# Patient Record
Sex: MALE | Race: WHITE | NOT HISPANIC OR LATINO | Employment: FULL TIME | URBAN - METROPOLITAN AREA
[De-identification: names, ages, dates, MRNs, and addresses within clinical notes are randomized per-mention and may not be internally consistent; named-entity substitution may affect disease eponyms.]

---

## 2017-01-03 ENCOUNTER — OFFICE VISIT (OUTPATIENT)
Dept: RETAIL CLINIC | Facility: CLINIC | Age: 27
End: 2017-01-03

## 2017-01-03 VITALS
TEMPERATURE: 99.7 F | OXYGEN SATURATION: 98 % | SYSTOLIC BLOOD PRESSURE: 116 MMHG | HEART RATE: 75 BPM | RESPIRATION RATE: 18 BRPM | DIASTOLIC BLOOD PRESSURE: 64 MMHG

## 2017-01-03 DIAGNOSIS — K52.9 GASTROENTERITIS: Primary | ICD-10-CM

## 2017-01-03 PROCEDURE — 99213 OFFICE O/P EST LOW 20 MIN: CPT | Performed by: NURSE PRACTITIONER

## 2017-01-03 RX ORDER — ONDANSETRON 4 MG/1
4 TABLET, ORALLY DISINTEGRATING ORAL EVERY 8 HOURS PRN
Qty: 21 TABLET | Refills: 0 | Status: SHIPPED | OUTPATIENT
Start: 2017-01-03 | End: 2017-01-10

## 2017-01-03 NOTE — PATIENT INSTRUCTIONS
Norovirus Infection  A norovirus infection is caused by exposure to a virus in a group of similar viruses (noroviruses). This type of infection causes inflammation in your stomach and intestines (gastroenteritis). Norovirus is the most common cause of gastroenteritis. It also causes food poisoning.  Anyone can get a norovirus infection. It spreads very easily (contagious). You can get it from contaminated food, water, surfaces, or other people. Norovirus is found in the stool or vomit of infected people. You can spread the infection as soon as you feel sick until 2 weeks after you recover.   Symptoms usually begin within 2 days after you become infected. Most norovirus symptoms affect the digestive system.  CAUSES  Norovirus infection is caused by contact with norovirus. You can catch norovirus if you:  · Eat or drink something contaminated with norovirus.  · Touch surfaces or objects contaminated with norovirus and then put your hand in your mouth.  · Have direct contact with an infected person who has symptoms.  · Share food, drink, or utensils with someone with who is sick with norovirus.  SIGNS AND SYMPTOMS  Symptoms of norovirus may include:  · Nausea.  · Vomiting.  · Diarrhea.  · Stomach cramps.  · Fever.  · Chills.  · Headache.  · Muscle aches.  · Tiredness.  DIAGNOSIS  Your health care provider may suspect norovirus based on your symptoms and physical exam. Your health care provider may also test a sample of your stool or vomit for the virus.   TREATMENT  There is no specific treatment for norovirus. Most people get better without treatment in about 2 days.  HOME CARE INSTRUCTIONS  · Replace lost fluids by drinking plenty of water or rehydration fluids containing important minerals called electrolytes. This prevents dehydration. Drink enough fluid to keep your urine clear or pale yellow.  · Do not prepare food for others while you are infected. Wait at least 3 days after recovering from the illness to do  that.  PREVENTION   · Wash your hands often, especially after using the toilet or changing a diaper.  · Wash fruits and vegetables thoroughly before preparing or serving them.  · Throw out any food that a sick person may have touched.  · Disinfect contaminated surfaces immediately after someone in the household has been sick. Use a bleach-based household .  · Immediately remove and wash soiled clothes or sheets.  SEEK MEDICAL CARE IF:  · Your vomiting, diarrhea, and stomach pain is getting worse.  · Your symptoms of norovirus do not go away after 2-3 days.  SEEK IMMEDIATE MEDICAL CARE IF:   You develop symptoms of dehydration that do not improve with fluid replacement. This may include:  · Excessive sleepiness.  · Lack of tears.  · Dry mouth.  · Dizziness when standing.  · Weak pulse.     This information is not intended to replace advice given to you by your health care provider. Make sure you discuss any questions you have with your health care provider.     Document Released: 03/09/2004 Document Revised: 01/08/2016 Document Reviewed: 05/28/2015  [employer] Interactive Patient Education ©2016 [employer]

## 2017-01-03 NOTE — PROGRESS NOTES
Jeffry Huynh is a 26 y.o. male.     Diarrhea    This is a new problem. The current episode started today (this morning around 3a, 3 occurances, watery/brown, none since). The problem occurs 2 to 4 times per day. The problem has been gradually improving. Associated symptoms include chills, a fever, headaches, myalgias, sweats and vomiting. Pertinent negatives include no abdominal pain, bloating, coughing or increased  flatus. Exacerbated by: eating. Risk factors include suspect food intake (concerned raw cookie dough he ate yesterday could be cause of current symptoms). He has tried nothing for the symptoms. There is no history of bowel resection, inflammatory bowel disease, irritable bowel syndrome, malabsorption or a recent abdominal surgery.   Vomiting    This is a new problem. The current episode started today (began at 3a, 3 occurances, liquid/yellow/thin consistency, none since that time ). The problem occurs 2 to 4 times per day. The problem has been resolved. The emesis has an appearance of stomach contents. The maximum temperature recorded prior to his arrival was 102 - 102.9 F. The fever has been present for less than 1 day. Associated symptoms include chills, diarrhea, a fever, headaches, myalgias and sweats. Pertinent negatives include no abdominal pain, chest pain, coughing or dizziness. Risk factors include suspect food intake. He has tried acetaminophen (this AM for fever and achiness) for the symptoms. The treatment provided mild relief.       The following portions of the patient's history were reviewed and updated as appropriate: allergies, current medications, past family history, past medical history, past social history, past surgical history and problem list.    Review of Systems   Constitutional: Positive for appetite change (diminished), chills, fatigue and fever. Negative for diaphoresis.   HENT: Negative for congestion, ear discharge, ear pain, facial swelling, hearing loss,  mouth sores, nosebleeds, postnasal drip, rhinorrhea, sinus pressure, sneezing, sore throat, tinnitus, trouble swallowing and voice change.    Eyes: Negative for pain, discharge, redness and itching.   Respiratory: Negative for cough, chest tightness, shortness of breath, wheezing and stridor.    Cardiovascular: Negative for chest pain and palpitations.   Gastrointestinal: Positive for diarrhea, nausea and vomiting. Negative for abdominal distention, abdominal pain, bloating, constipation and flatus.   Genitourinary: Negative for decreased urine volume.   Musculoskeletal: Positive for myalgias. Negative for neck pain and neck stiffness.   Skin: Negative for rash.   Allergic/Immunologic: Negative for environmental allergies and food allergies.   Neurological: Positive for headaches. Negative for dizziness, syncope and weakness.       Objective   Physical Exam   Constitutional: He is oriented to person, place, and time. He appears well-developed and well-nourished. He is cooperative.  Non-toxic appearance. He does not appear ill. No distress.   HENT:   Mouth/Throat: Uvula is midline, oropharynx is clear and moist and mucous membranes are normal. Tonsils are 2+ on the right. Tonsils are 2+ on the left. No tonsillar exudate.   Eyes: Conjunctivae and lids are normal.   Cardiovascular: Normal rate, regular rhythm, S1 normal and S2 normal.    Pulmonary/Chest: Effort normal and breath sounds normal.   Abdominal: Soft. Normal appearance. Bowel sounds are increased. There is no hepatosplenomegaly. There is no tenderness. There is no rigidity, no rebound, no guarding, no CVA tenderness, no tenderness at McBurney's point and negative Chery's sign.   Lymphadenopathy:     He has no cervical adenopathy.   Neurological: He is alert and oriented to person, place, and time.   Skin: Skin is warm and dry. He is not diaphoretic. No pallor.   Vitals reviewed.      Assessment/Plan   Will was seen today for diarrhea, vomiting, chills,  generalized body aches and headache.    Diagnoses and all orders for this visit:    Gastroenteritis    Other orders  -     ondansetron ODT (ZOFRAN ODT) 4 MG disintegrating tablet; Take 1 tablet by mouth Every 8 (Eight) Hours As Needed for nausea or vomiting for up to 7 days.          -     May use ibuprofen or tylenol per box instructions for fever or pain        -     Establish care with PCP, follow up with PCP for persistent or worsening symptoms

## 2017-06-11 ENCOUNTER — OFFICE VISIT (OUTPATIENT)
Dept: RETAIL CLINIC | Facility: CLINIC | Age: 27
End: 2017-06-11

## 2017-06-11 VITALS
HEART RATE: 62 BPM | TEMPERATURE: 98.4 F | DIASTOLIC BLOOD PRESSURE: 82 MMHG | OXYGEN SATURATION: 99 % | SYSTOLIC BLOOD PRESSURE: 120 MMHG | RESPIRATION RATE: 16 BRPM

## 2017-06-11 DIAGNOSIS — H60.392 OTHER INFECTIVE ACUTE OTITIS EXTERNA OF LEFT EAR: ICD-10-CM

## 2017-06-11 DIAGNOSIS — H61.22 LEFT EAR IMPACTED CERUMEN: Primary | ICD-10-CM

## 2017-06-11 PROCEDURE — 69209 REMOVE IMPACTED EAR WAX UNI: CPT | Performed by: NURSE PRACTITIONER

## 2017-06-11 PROCEDURE — 99213 OFFICE O/P EST LOW 20 MIN: CPT | Performed by: NURSE PRACTITIONER

## 2017-06-11 RX ORDER — CIPROFLOXACIN AND DEXAMETHASONE 3; 1 MG/ML; MG/ML
4 SUSPENSION/ DROPS AURICULAR (OTIC) 2 TIMES DAILY
Qty: 1 EACH | Refills: 0 | Status: SHIPPED | OUTPATIENT
Start: 2017-06-11 | End: 2017-06-18

## 2017-06-11 NOTE — PROGRESS NOTES
[facility]    CC:   Chief Complaint   Patient presents with   • Earache     HPI  27 YOM presents c/o left ear pressure. Used debrox on both ears over past few days, lots out of right ear but nothing out of left. Now hearing is diminished and pressure sensation in left ear.     Denies f/c/cough/soa/n/v/d/chest pain/abdominal pain/recent illness/sick exposures/change in bowel or bladder habits/no weight change/bloody emesis or bloody stools/change in medications or any other new concerns.    Review of Systems: Please see the above history of present illness for pertinent positives and negatives. The remainder of the patient's systems have been reviewed and are negative.     History reviewed. No pertinent past medical history.    Past Surgical History:   Procedure Laterality Date   • NECK MASS EXCISION Right 1994       Social History   Substance Use Topics   • Smoking status: Never Smoker   • Smokeless tobacco: None   • Alcohol use Yes      Comment: occasional        Current Outpatient Prescriptions:   •  ciprofloxacin-dexamethasone (CIPRODEX) 0.3-0.1 % otic suspension, Administer 4 drops into the left ear 2 (Two) Times a Day for 7 days., Disp: 1 each, Rfl: 0    No Known Allergies    OBJECTIVE:    /82  Pulse 62  Temp 98.4 °F (36.9 °C) (Oral)   Resp 16  SpO2 99%    Lab Results (last 24 hours)     ** No results found for the last 24 hours. **          /82  Pulse 62  Temp 98.4 °F (36.9 °C) (Oral)   Resp 16  SpO2 99%    General Appearance:    Alert, cooperative, no distress, appears stated age   Head:    Normocephalic, without obvious abnormality, atraumatic   Eyes:    PERRL, conjunctiva/corneas clear, EOM's intact, fundi     benign, both eyes   Ears:    Left ear with impacted dark yellow/brown cerumen noted, after irrigation, canal noted to be erythematous, excoriated.  Bilateral TMs without erythema.    Nose:   Nares normal, septum midline, mucosa normal, no drainage     or sinus tenderness    Throat:   Lips, mucosa, and tongue normal; teeth and gums normal   Neck:   Supple, symmetrical, trachea midline, no adenopathy;            Lungs:     Clear to auscultation bilaterally, respirations unlabored   Chest Wall:    No tenderness or deformity    Heart:    Regular rate and rhythm, S1 and S2 normal, no murmur, rub    or gallop                                       ASSESSMENT/PLAN    1. Left ear impacted cerumen  Ear Irrigation  WHAT IS EAR IRRIGATION?  Ear irrigation is a procedure to wash dirt and wax out of your ear canal. This procedure is also called lavage. You may need ear irrigation if you are having trouble hearing because of a buildup of earwax. You may also have ear irrigation as part of the treatment for an ear infection. Getting wax and dirt out of your ear canal can help some medicines given as ear drops work better.  HOW IS EAR IRRIGATION PERFORMED?    The procedure may vary among health care providers and hospitals. You may be given ear drops to put in your ear 15-20 minutes before irrigation. This helps loosen the wax. Then, a syringe containing water and a sterile salt solution (saline) can be gently inserted into the ear canal. The saline is used to flush out wax and other debris.  Ear irrigation kits are also available to use at home. Ask your health care provider if this is an option for you. Use a home irrigation kit only as told by your health care provider. Read the package instructions carefully. Follow the directions for using the syringe. Use water that is room temperature.  Do not do ear irrigation at home if you:  · Have diabetes. Diabetes increases the risk of infection.  · Have a hole or tear in your eardrum.  · Have tubes in your ears.  WHAT ARE THE RISKS OF EAR IRRIGATION?  Generally, this is a safe procedure. However, problems may occur, including:  · Infection.  · Pain.  · Hearing loss.  · Pushing water and debris into the eardrum. This can occur if there are holes in the  "eardrum.  · Ear irrigation failing to work.  HOW SHOULD I CARE FOR MY EARS AFTER HAVING THEM IRRIGATED?  Cleaning  · Clean the outside of your ear with a soft washcloth daily.  · If told by your health care provider, use a few drops of baby oil, mineral oil, glycerin, hydrogen peroxide, or over-the-counter earwax softening drops.  · Do not use cotton swabs to clean your ears. These can push wax down into the ear canal.  · Do not put anything into your ears to try to remove wax. This includes ear candles.  General Instructions  · Take over-the-counter and prescription medicines only as told by your health care provider.  · If you were prescribed an antibiotic medicine, use it as told by your health care provider. Do not stop using the antibiotic even if your condition improves.  · Keep all follow-up visits as told by your health care provider. This is important.  · Visit your health care provider at least once a year to have your ears and hearing checked.  WHEN SHOULD I SEEK MEDICAL CARE?  Seek medical care if:  · Your hearing is not improving or is getting worse.  · You have pain or redness in your ear.  · You have fluid, blood, or pus coming out of your ear.     This information is not intended to replace advice given to you by your health care provider. Make sure you discuss any questions you have with your health care provider.     Document Released: 01/13/2017 Document Reviewed: 01/13/2017  [employer] Interactive Patient Education ©2017 [employer]       2. Other infective acute otitis externa of left ear    - ciprofloxacin-dexamethasone (CIPRODEX) 0.3-0.1 % otic suspension; Administer 4 drops into the left ear 2 (Two) Times a Day for 7 days.  Dispense: 1 each; Refill: 0  Otitis Externa    Otitis externa is a bacterial or fungal infection of the outer ear canal. This is the area from the eardrum to the outside of the ear. Otitis externa is sometimes called \"swimmer's ear.\"  CAUSES   Possible causes of infection " include:  · Swimming in dirty water.  · Moisture remaining in the ear after swimming or bathing.  · Mild injury (trauma) to the ear.  · Objects stuck in the ear (foreign body).  · Cuts or scrapes (abrasions) on the outside of the ear.  SIGNS AND SYMPTOMS   The first symptom of infection is often itching in the ear canal. Later signs and symptoms may include swelling and redness of the ear canal, ear pain, and yellowish-white fluid (pus) coming from the ear. The ear pain may be worse when pulling on the earlobe.  DIAGNOSIS   Your health care provider will perform a physical exam. A sample of fluid may be taken from the ear and examined for bacteria or fungi.  TREATMENT   Antibiotic ear drops are often given for 10 to 14 days. Treatment may also include pain medicine or corticosteroids to reduce itching and swelling.  HOME CARE INSTRUCTIONS   · Apply antibiotic ear drops to the ear canal as prescribed by your health care provider.  · Take medicines only as directed by your health care provider.  · If you have diabetes, follow any additional treatment instructions from your health care provider.  · Keep all follow-up visits as directed by your health care provider.  PREVENTION   · Keep your ear dry. Use the corner of a towel to absorb water out of the ear canal after swimming or bathing.  · Avoid scratching or putting objects inside your ear. This can damage the ear canal or remove the protective wax that lines the canal. This makes it easier for bacteria and fungi to grow.  · Avoid swimming in lakes, polluted water, or poorly chlorinated pools.  · You may use ear drops made of rubbing alcohol and vinegar after swimming. Combine equal parts of white vinegar and alcohol in a bottle. Put 3 or 4 drops into each ear after swimming.  SEEK MEDICAL CARE IF:   · You have a fever.  · Your ear is still red, swollen, painful, or draining pus after 3 days.  · Your redness, swelling, or pain gets worse.  · You have a severe  headache.  · You have redness, swelling, pain, or tenderness in the area behind your ear.  MAKE SURE YOU:   · Understand these instructions.  · Will watch your condition.  · Will get help right away if you are not doing well or get worse.     This information is not intended to replace advice given to you by your health care provider. Make sure you discuss any questions you have with your health care provider.     Document Released: 12/18/2006 Document Revised: 01/08/2016 Document Reviewed: 09/26/2016  [employer] Interactive Patient Education ©2017 [employer]

## 2017-07-30 ENCOUNTER — APPOINTMENT (OUTPATIENT)
Dept: GENERAL RADIOLOGY | Facility: HOSPITAL | Age: 27
End: 2017-07-30

## 2017-07-30 ENCOUNTER — APPOINTMENT (OUTPATIENT)
Dept: CT IMAGING | Facility: HOSPITAL | Age: 27
End: 2017-07-30

## 2017-07-30 ENCOUNTER — HOSPITAL ENCOUNTER (EMERGENCY)
Facility: HOSPITAL | Age: 27
Discharge: ANOTHER HOSPITAL | End: 2017-07-30
Attending: EMERGENCY MEDICINE | Admitting: EMERGENCY MEDICINE

## 2017-07-30 VITALS
TEMPERATURE: 98.5 F | DIASTOLIC BLOOD PRESSURE: 69 MMHG | RESPIRATION RATE: 18 BRPM | HEIGHT: 72 IN | WEIGHT: 218.44 LBS | HEART RATE: 78 BPM | BODY MASS INDEX: 29.59 KG/M2 | OXYGEN SATURATION: 99 % | SYSTOLIC BLOOD PRESSURE: 107 MMHG

## 2017-07-30 DIAGNOSIS — S12.600A CLOSED DISPLACED FRACTURE OF SEVENTH CERVICAL VERTEBRA, UNSPECIFIED FRACTURE MORPHOLOGY, INITIAL ENCOUNTER (HCC): Primary | ICD-10-CM

## 2017-07-30 DIAGNOSIS — S20.221A BACK CONTUSION, RIGHT, INITIAL ENCOUNTER: ICD-10-CM

## 2017-07-30 DIAGNOSIS — S37.001A: ICD-10-CM

## 2017-07-30 LAB
ALBUMIN SERPL-MCNC: 4.6 G/DL (ref 3.5–5.2)
ALBUMIN/GLOB SERPL: 1.7 G/DL
ALP SERPL-CCNC: 81 U/L (ref 40–129)
ALT SERPL W P-5'-P-CCNC: 38 U/L (ref 5–41)
ANION GAP SERPL CALCULATED.3IONS-SCNC: 15.9 MMOL/L
AST SERPL-CCNC: 40 U/L (ref 5–40)
BACTERIA UR QL AUTO: ABNORMAL /HPF
BASOPHILS # BLD AUTO: 0.1 10*3/MM3 (ref 0–0.2)
BASOPHILS NFR BLD AUTO: 1 % (ref 0–2)
BILIRUB SERPL-MCNC: 0.2 MG/DL (ref 0.2–1.2)
BILIRUB UR QL STRIP: NEGATIVE
BUN BLD-MCNC: 17 MG/DL (ref 6–20)
BUN/CREAT SERPL: 18.3 (ref 7–25)
CALCIUM SPEC-SCNC: 9 MG/DL (ref 8.6–10.5)
CHLORIDE SERPL-SCNC: 99 MMOL/L (ref 98–107)
CLARITY UR: CLEAR
CO2 SERPL-SCNC: 22.1 MMOL/L (ref 22–29)
COLOR UR: ABNORMAL
CREAT BLD-MCNC: 0.93 MG/DL (ref 0.76–1.27)
DEPRECATED RDW RBC AUTO: 38.7 FL (ref 37–54)
EOSINOPHIL # BLD AUTO: 0.51 10*3/MM3 (ref 0.1–0.3)
EOSINOPHIL NFR BLD AUTO: 5.3 % (ref 0–4)
ERYTHROCYTE [DISTWIDTH] IN BLOOD BY AUTOMATED COUNT: 13 % (ref 11.5–14.5)
GFR SERPL CREATININE-BSD FRML MDRD: 97 ML/MIN/1.73
GLOBULIN UR ELPH-MCNC: 2.7 GM/DL
GLUCOSE BLD-MCNC: 132 MG/DL (ref 65–99)
GLUCOSE UR STRIP-MCNC: NEGATIVE MG/DL
HCT VFR BLD AUTO: 46.1 % (ref 42–52)
HGB BLD-MCNC: 15 G/DL (ref 14–18)
HGB UR QL STRIP.AUTO: ABNORMAL
HYALINE CASTS UR QL AUTO: ABNORMAL /LPF
IMM GRANULOCYTES # BLD: 0.27 10*3/MM3 (ref 0–0.03)
IMM GRANULOCYTES NFR BLD: 2.8 % (ref 0–0.5)
KETONES UR QL STRIP: NEGATIVE
LEUKOCYTE ESTERASE UR QL STRIP.AUTO: NEGATIVE
LYMPHOCYTES # BLD AUTO: 3.59 10*3/MM3 (ref 0.6–4.8)
LYMPHOCYTES NFR BLD AUTO: 37.3 % (ref 20–45)
MCH RBC QN AUTO: 27.1 PG (ref 27–31)
MCHC RBC AUTO-ENTMCNC: 32.5 G/DL (ref 31–37)
MCV RBC AUTO: 83.2 FL (ref 80–94)
MONOCYTES # BLD AUTO: 0.65 10*3/MM3 (ref 0–1)
MONOCYTES NFR BLD AUTO: 6.8 % (ref 3–8)
NEUTROPHILS # BLD AUTO: 4.5 10*3/MM3 (ref 1.5–8.3)
NEUTROPHILS NFR BLD AUTO: 46.8 % (ref 45–70)
NITRITE UR QL STRIP: NEGATIVE
NRBC BLD MANUAL-RTO: 0 /100 WBC (ref 0–0)
PH UR STRIP.AUTO: 5.5 [PH] (ref 4.5–8)
PLATELET # BLD AUTO: 352 10*3/MM3 (ref 140–500)
PMV BLD AUTO: 9.7 FL (ref 7.4–10.4)
POTASSIUM BLD-SCNC: 4 MMOL/L (ref 3.5–5.2)
PROT SERPL-MCNC: 7.3 G/DL (ref 6–8.5)
PROT UR QL STRIP: NEGATIVE
RBC # BLD AUTO: 5.54 10*6/MM3 (ref 4.7–6.1)
RBC # UR: ABNORMAL /HPF
REF LAB TEST METHOD: ABNORMAL
SODIUM BLD-SCNC: 137 MMOL/L (ref 136–145)
SP GR UR STRIP: 1.02 (ref 1–1.03)
SQUAMOUS #/AREA URNS HPF: ABNORMAL /HPF
UROBILINOGEN UR QL STRIP: ABNORMAL
WBC NRBC COR # BLD: 9.62 10*3/MM3 (ref 4.8–10.8)
WBC UR QL AUTO: ABNORMAL /HPF

## 2017-07-30 PROCEDURE — 96374 THER/PROPH/DIAG INJ IV PUSH: CPT

## 2017-07-30 PROCEDURE — 73552 X-RAY EXAM OF FEMUR 2/>: CPT

## 2017-07-30 PROCEDURE — 25010000002 FENTANYL CITRATE (PF) 100 MCG/2ML SOLUTION

## 2017-07-30 PROCEDURE — 81003 URINALYSIS AUTO W/O SCOPE: CPT | Performed by: EMERGENCY MEDICINE

## 2017-07-30 PROCEDURE — 96376 TX/PRO/DX INJ SAME DRUG ADON: CPT

## 2017-07-30 PROCEDURE — 72040 X-RAY EXAM NECK SPINE 2-3 VW: CPT

## 2017-07-30 PROCEDURE — 25010000002 ONDANSETRON PER 1 MG

## 2017-07-30 PROCEDURE — 81001 URINALYSIS AUTO W/SCOPE: CPT | Performed by: EMERGENCY MEDICINE

## 2017-07-30 PROCEDURE — 73502 X-RAY EXAM HIP UNI 2-3 VIEWS: CPT

## 2017-07-30 PROCEDURE — 96361 HYDRATE IV INFUSION ADD-ON: CPT

## 2017-07-30 PROCEDURE — 96375 TX/PRO/DX INJ NEW DRUG ADDON: CPT

## 2017-07-30 PROCEDURE — 25010000002 FENTANYL CITRATE (PF) 100 MCG/2ML SOLUTION: Performed by: EMERGENCY MEDICINE

## 2017-07-30 PROCEDURE — 99284 EMERGENCY DEPT VISIT MOD MDM: CPT

## 2017-07-30 PROCEDURE — 80053 COMPREHEN METABOLIC PANEL: CPT | Performed by: EMERGENCY MEDICINE

## 2017-07-30 PROCEDURE — 99291 CRITICAL CARE FIRST HOUR: CPT | Performed by: EMERGENCY MEDICINE

## 2017-07-30 PROCEDURE — 85025 COMPLETE CBC W/AUTO DIFF WBC: CPT | Performed by: EMERGENCY MEDICINE

## 2017-07-30 RX ORDER — FENTANYL CITRATE 50 UG/ML
100 INJECTION, SOLUTION INTRAMUSCULAR; INTRAVENOUS ONCE
Status: COMPLETED | OUTPATIENT
Start: 2017-07-30 | End: 2017-07-30

## 2017-07-30 RX ORDER — SODIUM CHLORIDE 9 MG/ML
125 INJECTION, SOLUTION INTRAVENOUS CONTINUOUS
Status: DISCONTINUED | OUTPATIENT
Start: 2017-07-30 | End: 2017-07-30 | Stop reason: HOSPADM

## 2017-07-30 RX ORDER — FENTANYL CITRATE 50 UG/ML
50 INJECTION, SOLUTION INTRAMUSCULAR; INTRAVENOUS ONCE
Status: COMPLETED | OUTPATIENT
Start: 2017-07-30 | End: 2017-07-30

## 2017-07-30 RX ORDER — ONDANSETRON 2 MG/ML
4 INJECTION INTRAMUSCULAR; INTRAVENOUS ONCE
Status: COMPLETED | OUTPATIENT
Start: 2017-07-30 | End: 2017-07-30

## 2017-07-30 RX ORDER — SODIUM CHLORIDE 0.9 % (FLUSH) 0.9 %
10 SYRINGE (ML) INJECTION AS NEEDED
Status: DISCONTINUED | OUTPATIENT
Start: 2017-07-30 | End: 2017-07-30 | Stop reason: HOSPADM

## 2017-07-30 RX ORDER — ONDANSETRON 2 MG/ML
INJECTION INTRAMUSCULAR; INTRAVENOUS
Status: COMPLETED
Start: 2017-07-30 | End: 2017-07-30

## 2017-07-30 RX ORDER — FENTANYL CITRATE 50 UG/ML
INJECTION, SOLUTION INTRAMUSCULAR; INTRAVENOUS
Status: COMPLETED
Start: 2017-07-30 | End: 2017-07-30

## 2017-07-30 RX ADMIN — FENTANYL CITRATE 50 MCG: 50 INJECTION, SOLUTION INTRAMUSCULAR; INTRAVENOUS at 09:48

## 2017-07-30 RX ADMIN — FENTANYL CITRATE 100 MCG: 50 INJECTION, SOLUTION INTRAMUSCULAR; INTRAVENOUS at 08:53

## 2017-07-30 RX ADMIN — SODIUM CHLORIDE 1000 ML: 9 INJECTION, SOLUTION INTRAVENOUS at 08:39

## 2017-07-30 RX ADMIN — SODIUM CHLORIDE 125 ML/HR: 9 INJECTION, SOLUTION INTRAVENOUS at 09:41

## 2017-07-30 RX ADMIN — ONDANSETRON 4 MG: 2 INJECTION INTRAMUSCULAR; INTRAVENOUS at 08:50

## 2017-07-30 RX ADMIN — ONDANSETRON 4 MG: 2 INJECTION, SOLUTION INTRAMUSCULAR; INTRAVENOUS at 08:50

## 2021-04-12 ENCOUNTER — APPOINTMENT (OUTPATIENT)
Dept: VACCINE CLINIC | Facility: HOSPITAL | Age: 31
End: 2021-04-12

## 2023-03-28 ENCOUNTER — OFFICE VISIT (OUTPATIENT)
Dept: FAMILY MEDICINE CLINIC | Facility: CLINIC | Age: 33
End: 2023-03-28
Payer: COMMERCIAL

## 2023-03-28 VITALS
OXYGEN SATURATION: 97 % | WEIGHT: 209 LBS | HEIGHT: 72 IN | BODY MASS INDEX: 28.31 KG/M2 | DIASTOLIC BLOOD PRESSURE: 74 MMHG | HEART RATE: 70 BPM | SYSTOLIC BLOOD PRESSURE: 118 MMHG

## 2023-03-28 DIAGNOSIS — R68.82 LOW LIBIDO: ICD-10-CM

## 2023-03-28 DIAGNOSIS — K21.9 GASTROESOPHAGEAL REFLUX DISEASE WITHOUT ESOPHAGITIS: ICD-10-CM

## 2023-03-28 DIAGNOSIS — Z11.59 ENCOUNTER FOR HEPATITIS C SCREENING TEST FOR LOW RISK PATIENT: ICD-10-CM

## 2023-03-28 DIAGNOSIS — Z00.00 PREVENTATIVE HEALTH CARE: Primary | ICD-10-CM

## 2023-03-28 DIAGNOSIS — Z13.220 SCREENING, LIPID: ICD-10-CM

## 2023-03-28 DIAGNOSIS — Z13.228 SCREENING FOR METABOLIC DISORDER: ICD-10-CM

## 2023-03-28 NOTE — PROGRESS NOTES
Subjective   Will Lino is a 33 y.o. male.     History of Present Illness   New pt to the provider and practice. Here to establish Primary care. He is overdue for labs, screening and vaccines. He has 2 acute concerns     Acute GERd and abd discomfort. Girlfriend has H Pylori infection and he wants to be tested. Omeprazole does not help. He has recently gained weight. BMI 28    Low libido over past 2 months and would like testosterone checked. He has weight fluctuations and some fatigue.     The following portions of the patient's history were reviewed and updated as appropriate: allergies, current medications, past family history, past medical history, past social history, past surgical history and problem list.    Review of Systems   Constitutional: Positive for fatigue. Negative for activity change and unexpected weight loss.   HENT: Negative for congestion.         Dental exam is due      Eyes: Negative for visual disturbance.        Contacts, eye exam is utd     Respiratory: Negative for cough, shortness of breath and wheezing.    Cardiovascular: Negative for chest pain, palpitations and leg swelling.   Gastrointestinal: Positive for GERD and indigestion. Negative for abdominal distention, constipation, diarrhea, nausea and vomiting.   Endocrine: Negative for cold intolerance, heat intolerance, polydipsia, polyphagia and polyuria.   Genitourinary: Negative for dysuria, erectile dysfunction and urinary incontinence.   Musculoskeletal: Negative for arthralgias and back pain.   Skin: Negative for rash.   Allergic/Immunologic: Positive for environmental allergies.   Neurological: Negative for dizziness, seizures, syncope, weakness and headache.   Hematological: Does not bruise/bleed easily.   Psychiatric/Behavioral: Negative for sleep disturbance, suicidal ideas, depressed mood and stress. The patient is not nervous/anxious.        Objective   Physical Exam  Vitals reviewed.   Constitutional:       Appearance:  Normal appearance. He is normal weight.   HENT:      Head: Normocephalic.      Right Ear: Tympanic membrane normal.      Left Ear: Tympanic membrane normal.      Nose: Nose normal.      Mouth/Throat:      Mouth: Mucous membranes are moist.   Eyes:      Pupils: Pupils are equal, round, and reactive to light.   Cardiovascular:      Rate and Rhythm: Normal rate and regular rhythm.      Pulses: Normal pulses.      Heart sounds: Normal heart sounds.   Pulmonary:      Effort: Pulmonary effort is normal.      Breath sounds: Normal breath sounds.   Abdominal:      General: Abdomen is flat. Bowel sounds are normal.      Palpations: Abdomen is soft.   Musculoskeletal:         General: Normal range of motion.      Cervical back: Normal range of motion.   Skin:     General: Skin is warm.   Neurological:      General: No focal deficit present.      Mental Status: He is alert.   Psychiatric:         Mood and Affect: Mood normal.         Vitals:    03/28/23 0814   BP: 118/74   Pulse: 70   SpO2: 97%     Body mass index is 28.35 kg/m².    Procedures    Assessment & Plan   Problems Addressed this Visit    None  Visit Diagnoses     Preventative health care    -  Primary    Screening for metabolic disorder        Screening, lipid        Encounter for hepatitis C screening test for low risk patient        Gastroesophageal reflux disease without esophagitis        Low libido          Diagnoses       Codes Comments    Preventative health care    -  Primary ICD-10-CM: Z00.00  ICD-9-CM: V70.0     Screening for metabolic disorder     ICD-10-CM: Z13.228  ICD-9-CM: V77.99     Screening, lipid     ICD-10-CM: Z13.220  ICD-9-CM: V77.91     Encounter for hepatitis C screening test for low risk patient     ICD-10-CM: Z11.59  ICD-9-CM: V73.89     Gastroesophageal reflux disease without esophagitis     ICD-10-CM: K21.9  ICD-9-CM: 530.81     Low libido     ICD-10-CM: R68.82  ICD-9-CM: 799.81         Orders Placed This Encounter   Procedures   • Tdap  Vaccine Greater Than or Equal To 6yo IM   • Comprehensive Metabolic Panel     Order Specific Question:   Release to patient     Answer:   Routine Release   • Hepatitis C Antibody     Order Specific Question:   Release to patient     Answer:   Routine Release   • Lipid Panel With / Chol / HDL Ratio     Order Specific Question:   Release to patient     Answer:   Routine Release   • TSH     Order Specific Question:   Release to patient     Answer:   Routine Release   • Helicobacter Pylori, IgA IgG IgM     Order Specific Question:   Release to patient     Answer:   Routine Release   • Testosterone     Order Specific Question:   Release to patient     Answer:   Routine Release   • CBC & Differential       Preventative care- Follow heart healthy diet, drink water, walk daily. Wear seatbelts, wear helmets, wear sunscreens. Follow CDC guidelines for covid pandemic.     GERD- check H pylori, limit triggers, cw PPI as needed    Low libido- check labs, limit pre workout/supplements, will refer to uro if low T     additional time on acute concerns > 21 min  Education provided in AVS   No follow-ups on file.

## 2023-03-29 LAB
ALBUMIN SERPL-MCNC: 4.6 G/DL (ref 4–5)
ALBUMIN/GLOB SERPL: 2.1 {RATIO} (ref 1.2–2.2)
ALP SERPL-CCNC: 40 IU/L (ref 44–121)
ALT SERPL-CCNC: 55 IU/L (ref 0–44)
AST SERPL-CCNC: 42 IU/L (ref 0–40)
BASOPHILS # BLD AUTO: 0.1 X10E3/UL (ref 0–0.2)
BASOPHILS NFR BLD AUTO: 1 %
BILIRUB SERPL-MCNC: 0.4 MG/DL (ref 0–1.2)
BUN SERPL-MCNC: 17 MG/DL (ref 6–20)
BUN/CREAT SERPL: 17 (ref 9–20)
CALCIUM SERPL-MCNC: 9.1 MG/DL (ref 8.7–10.2)
CHLORIDE SERPL-SCNC: 103 MMOL/L (ref 96–106)
CHOLEST SERPL-MCNC: 205 MG/DL (ref 100–199)
CHOLEST/HDLC SERPL: 6.8 RATIO (ref 0–5)
CO2 SERPL-SCNC: 25 MMOL/L (ref 20–29)
CREAT SERPL-MCNC: 0.99 MG/DL (ref 0.76–1.27)
EGFRCR SERPLBLD CKD-EPI 2021: 103 ML/MIN/1.73
EOSINOPHIL # BLD AUTO: 0.2 X10E3/UL (ref 0–0.4)
EOSINOPHIL NFR BLD AUTO: 3 %
ERYTHROCYTE [DISTWIDTH] IN BLOOD BY AUTOMATED COUNT: 13.4 % (ref 11.6–15.4)
GLOBULIN SER CALC-MCNC: 2.2 G/DL (ref 1.5–4.5)
GLUCOSE SERPL-MCNC: 86 MG/DL (ref 70–99)
H PYLORI IGA SER-ACNC: <9 UNITS (ref 0–8.9)
H PYLORI IGG SER IA-ACNC: 0.13 INDEX VALUE (ref 0–0.79)
H PYLORI IGM SER-ACNC: <9 UNITS (ref 0–8.9)
HCT VFR BLD AUTO: 43.8 % (ref 37.5–51)
HCV IGG SERPL QL IA: NON REACTIVE
HDLC SERPL-MCNC: 30 MG/DL
HGB BLD-MCNC: 14.6 G/DL (ref 13–17.7)
IMM GRANULOCYTES # BLD AUTO: 0 X10E3/UL (ref 0–0.1)
IMM GRANULOCYTES NFR BLD AUTO: 1 %
LDLC SERPL CALC-MCNC: 160 MG/DL (ref 0–99)
LYMPHOCYTES # BLD AUTO: 1.6 X10E3/UL (ref 0.7–3.1)
LYMPHOCYTES NFR BLD AUTO: 31 %
MCH RBC QN AUTO: 28.8 PG (ref 26.6–33)
MCHC RBC AUTO-ENTMCNC: 33.3 G/DL (ref 31.5–35.7)
MCV RBC AUTO: 86 FL (ref 79–97)
MONOCYTES # BLD AUTO: 0.4 X10E3/UL (ref 0.1–0.9)
MONOCYTES NFR BLD AUTO: 8 %
NEUTROPHILS # BLD AUTO: 2.9 X10E3/UL (ref 1.4–7)
NEUTROPHILS NFR BLD AUTO: 56 %
PLATELET # BLD AUTO: 373 X10E3/UL (ref 150–450)
POTASSIUM SERPL-SCNC: 5.4 MMOL/L (ref 3.5–5.2)
PROT SERPL-MCNC: 6.8 G/DL (ref 6–8.5)
RBC # BLD AUTO: 5.07 X10E6/UL (ref 4.14–5.8)
SODIUM SERPL-SCNC: 141 MMOL/L (ref 134–144)
TESTOST SERPL-MCNC: 31 NG/DL (ref 264–916)
TRIGL SERPL-MCNC: 80 MG/DL (ref 0–149)
TSH SERPL DL<=0.005 MIU/L-ACNC: 0.9 UIU/ML (ref 0.45–4.5)
VLDLC SERPL CALC-MCNC: 15 MG/DL (ref 5–40)
WBC # BLD AUTO: 5.2 X10E3/UL (ref 3.4–10.8)

## 2023-03-30 DIAGNOSIS — R79.89 LOW TESTOSTERONE IN MALE: Primary | ICD-10-CM

## 2024-04-15 NOTE — MR AVS SNAPSHOT
Jose De Jesus Huynh   1/3/2017 3:30 PM   Office Visit    Dept Phone:  [phone]   Encounter #:  [id number]    Provider:  Donal Sargent   Department:  [facility]                Your Full Care Plan              Today's Medication Changes          These changes are accurate as of: 1/3/17  4:06 PM.  If you have any questions, ask your nurse or doctor.               New Medication(s)Ordered:     ondansetron ODT 4 MG disintegrating tablet   Commonly known as:  ZOFRAN ODT   Take 1 tablet by mouth Every 8 (Eight) Hours As Needed for nausea or vomiting for up to 7 days.   Started by:  Provider Rebeca Sargent            Where to Get Your Medications      These medications were sent to [id number] - [place], [place] - [address] AT [facility] [address] - [phone]  - [phone]   [address], [place] [address]     Phone:  [phone]     ondansetron ODT 4 MG disintegrating tablet                  Your Updated Medication List          This list is accurate as of: 1/3/17  4:06 PM.  Always use your most recent med list.                ondansetron ODT 4 MG disintegrating tablet   Commonly known as:  ZOFRAN ODT   Take 1 tablet by mouth Every 8 (Eight) Hours As Needed for nausea or vomiting for up to 7 days.               You Were Diagnosed With        Codes Comments    Gastroenteritis    -  Primary ICD-10-CM: K52.9  ICD-9-CM: 558.9       Instructions    Norovirus Infection  A norovirus infection is caused by exposure to a virus in a group of similar viruses (noroviruses). This type of infection causes inflammation in your stomach and intestines (gastroenteritis). Norovirus is the most common cause of gastroenteritis. It also causes food poisoning.  Anyone can get a norovirus infection. It spreads very easily (contagious). You can get it from contaminated food, water, surfaces, or other people. Norovirus is found in the stool or vomit of infected  people. You can spread the infection as soon as you feel sick until 2 weeks after you recover.   Symptoms usually begin within 2 days after you become infected. Most norovirus symptoms affect the digestive system.  CAUSES  Norovirus infection is caused by contact with norovirus. You can catch norovirus if you:  · Eat or drink something contaminated with norovirus.  · Touch surfaces or objects contaminated with norovirus and then put your hand in your mouth.  · Have direct contact with an infected person who has symptoms.  · Share food, drink, or utensils with someone with who is sick with norovirus.  SIGNS AND SYMPTOMS  Symptoms of norovirus may include:  · Nausea.  · Vomiting.  · Diarrhea.  · Stomach cramps.  · Fever.  · Chills.  · Headache.  · Muscle aches.  · Tiredness.  DIAGNOSIS  Your health care provider may suspect norovirus based on your symptoms and physical exam. Your health care provider may also test a sample of your stool or vomit for the virus.   TREATMENT  There is no specific treatment for norovirus. Most people get better without treatment in about 2 days.  HOME CARE INSTRUCTIONS  · Replace lost fluids by drinking plenty of water or rehydration fluids containing important minerals called electrolytes. This prevents dehydration. Drink enough fluid to keep your urine clear or pale yellow.  · Do not prepare food for others while you are infected. Wait at least 3 days after recovering from the illness to do that.  PREVENTION   · Wash your hands often, especially after using the toilet or changing a diaper.  · Wash fruits and vegetables thoroughly before preparing or serving them.  · Throw out any food that a sick person may have touched.  · Disinfect contaminated surfaces immediately after someone in the household has been sick. Use a bleach-based household .  · Immediately remove and wash soiled clothes or sheets.  SEEK MEDICAL CARE IF:  · Your vomiting, diarrhea, and stomach pain is getting  worse.  · Your symptoms of norovirus do not go away after 2-3 days.  SEEK IMMEDIATE MEDICAL CARE IF:   You develop symptoms of dehydration that do not improve with fluid replacement. This may include:  · Excessive sleepiness.  · Lack of tears.  · Dry mouth.  · Dizziness when standing.  · Weak pulse.     This information is not intended to replace advice given to you by your health care provider. Make sure you discuss any questions you have with your health care provider.     Document Released: 2004 Document Revised: 2016 Document Reviewed: 2015  [employer] Patient Education  [employer]       Patient Instructions History      Upcoming Appointments     Visit Type Date Time Department    OFFICE VISIT 1/3/2017  3:30 PM [facility]      [facility] Signup     [facility] allows you to send messages to your doctor, view your test results, renew your prescriptions, schedule appointments, and more. To sign up, go to [employer] and click on the Sign Up Now link in the New User? box. Enter your [facility] Activation Code exactly as it appears below along with the last four digits of your Social Security Number and your Date of Birth () to complete the sign-up process. If you do not sign up before the expiration date, you must request a new code.    [facility] Activation Code: [email or web address]  Expires: 2017  4:05 PM    If you have questions, you can email [email or web address] or call [phone] to talk to our [facility] staff. Remember, [facility] is NOT to be used for urgent needs. For medical emergencies, dial 911.               Other Info from Your Visit           Allergies     No Known Allergies      Reason for Visit     Diarrhea pt reports diarrhea started @ 2 am 3 times.    Vomiting pt reports vomiting that started @ 2 am 3 times.     Chills     Generalized Body Aches     Headache           Vital Signs     Blood Pressure Pulse Temperature Respirations  Oxygen Saturation Smoking Status    116/64 (BP Location: Left arm, Patient Position: Sitting, Cuff Size: Adult) 75 99.7 °F (37.6 °C) (Oral) 18 98% Never Smoker      Problems and Diagnoses Noted     Gastroenteritis    -  Primary         sit pt up in bed

## 2024-04-29 RX ORDER — DOXYCYCLINE HYCLATE 100 MG/1
100 CAPSULE ORAL DAILY
Qty: 30 CAPSULE | Refills: 0 | Status: SHIPPED | OUTPATIENT
Start: 2024-04-29 | End: 2024-05-29

## 2024-04-30 DIAGNOSIS — L70.0 ACNE VULGARIS: Primary | ICD-10-CM
